# Patient Record
(demographics unavailable — no encounter records)

---

## 2024-11-08 NOTE — ADDENDUM
[FreeTextEntry1] : I, John Deanna, acted solely as a scribe for Dr. Aster Roche D.O. on this date 11/06/2024.   All medical record entries made by the Scribe were at my, Dr. Aster Roche D.O., direction and personally dictated by me on 11/06/2024. I have reviewed the chart and agree that the record accurately reflects my personal performance of the history, physical exam, assessment and plan. I have also personally directed, reviewed, and agreed with the chart.

## 2024-11-08 NOTE — ASSESSMENT
[FreeTextEntry1] : Dr. Roche reviewed with Merlene her A1C in office today. A1C showed that Merlene is borderline diabetic.   Venipuncture with labs done in office today, 06:00 PM 11/06/2024 to monitor Merlene's A1C.   Dr. Roche reviewed with Merlene her present medications; Merlene should continue her present medications.   Merlene should follow up with Dr. Roceh in 6 weeks.   15 minutes spent in cigarette smoking cessation counseling. Educated patient on deleterious effects and all potential consequences of cigarette smoking, such as COPD, lung cancer, etc. Counseled patient on various smoking cessation techniques, such as nicotine patch, Zyban, acupuncture, etc.  Patient agreed to attempt cigarette smoking cessation.  Will follow up on cigarette smoking cessation on next office visit.

## 2024-11-08 NOTE — ASSESSMENT
[FreeTextEntry1] : Dr. Roche reviewed with Merlene her A1C in office today. A1C showed that Merlene is borderline diabetic.   Venipuncture with labs done in office today, 06:00 PM 11/06/2024 to monitor Merlene's A1C.   Dr. Roche reviewed with Merlene her present medications; Merlene should continue her present medications.   Merlene should follow up with Dr. Roche in 6 weeks.   15 minutes spent in cigarette smoking cessation counseling. Educated patient on deleterious effects and all potential consequences of cigarette smoking, such as COPD, lung cancer, etc. Counseled patient on various smoking cessation techniques, such as nicotine patch, Zyban, acupuncture, etc.  Patient agreed to attempt cigarette smoking cessation.  Will follow up on cigarette smoking cessation on next office visit.

## 2024-11-08 NOTE — HISTORY OF PRESENT ILLNESS
[TextBox_4] : Merlene is a pleasant 63-year-old female with history of hypercholesterolemia, anxiety on xanax 0.5 TID, active cigarette smoking, she came in for follow up evaluation today. Merlene notes her compliance with her supplemental Vitamin D. Merlene notes her compliance with her crestor regimen. Merlene complains of ear inflammation.  She is an active cigarette smoker, smokes half a pack a day for 50 years.

## 2024-11-08 NOTE — PLAN
[FreeTextEntry1] : Venipuncture with labs drawn in office. Age-appropriate counseling and preventive care screening discussed. 15 minutes spent in cigarette smoking cessation counseling. Educated patient on deleterious effects and all potential consequences of cigarette smoking, such as COPD, lung cancer, etc. Counseled patient on various smoking cessation techniques, such as nicotine patch, Zyban, acupuncture, etc.  Patient agreed to attempt cigarette smoking cessation.  Will follow up on cigarette smoking cessation on next office visit. Advised Merlene to see cardiology evaluation for abnormal EKG, but she refused.  Will repeat EKG will follow-up in 3 months. Advised Merlene on mammogram, Pap smear and colonoscopy, but she is refused it. Advised her to get low-dose lung cancer screening chest CT scan "history of longtime cigarette smoking, she also refused it.   Return for follow-up in 3 months.

## 2024-12-19 NOTE — HISTORY OF PRESENT ILLNESS
[TextBox_4] : Merlene is a pleasant 63-year-old female with history of hypercholesterolemia, anxiety on xanax 0.5 TID, active cigarette smoking, she came in for follow up evaluation today. Merlene notes her compliance with her supplemental Vitamin D. Merlene notes her compliance with her crestor regimen. Merlene itch after eating She is an active cigarette smoker, smokes half a pack a day for 50 years.

## 2025-07-25 NOTE — ADDENDUM
[FreeTextEntry1] : This note was written by Eli Mcgrath on 07/23/2025 acting as medical scribe for Dr. Aster Roceh. I, Dr. Aster Roche, have read and attest that all the information, medical decision making and discharge instructions within are true and accurate.

## 2025-07-25 NOTE — HISTORY OF PRESENT ILLNESS
[TextBox_4] : JOSE RIVERS is a 64 year-old female who presents to the office today for pulmonary follow up visit. The patient reports to have a cough but otherwise is feeling reasonably well at this time with no respiratory complaints.   Patient is an active smoker.

## 2025-07-25 NOTE — ASSESSMENT
[FreeTextEntry1] : Venipuncture with labs drawn in office today.  15 minutes spent in cigarette smoking cessation counseling. Patient agreed to attempt cigarette smoking cessation. Will follow up on cigarette smoking cessation on next office visit.  Dr. Roche's recommendation:  - Patient requested Alendronate, albuterol inhaler, albuterol nebulizer, vitamin D capsules, rosuvastatin, and benzonatate. Everything was ordered.  - Start Z-Roberto 250 mg tablet, 2 tablets today, then 1 for 4 days, then stop, for cough. - Undergo a chest CT scan in 4 months.  - Return for follow up in 4 months.

## 2025-07-25 NOTE — ADDENDUM
[FreeTextEntry1] : This note was written by Eli Mcgrath on 07/23/2025 acting as medical scribe for Dr. Aster Roche. I, Dr. Aster Roche, have read and attest that all the information, medical decision making and discharge instructions within are true and accurate.